# Patient Record
Sex: FEMALE | NOT HISPANIC OR LATINO | URBAN - METROPOLITAN AREA
[De-identification: names, ages, dates, MRNs, and addresses within clinical notes are randomized per-mention and may not be internally consistent; named-entity substitution may affect disease eponyms.]

---

## 2024-09-27 ENCOUNTER — APPOINTMENT (RX ONLY)
Dept: URBAN - METROPOLITAN AREA CLINIC 20 | Facility: CLINIC | Age: 44
Setting detail: DERMATOLOGY
End: 2024-09-27

## 2024-09-27 DIAGNOSIS — L71.8 OTHER ROSACEA: ICD-10-CM | Status: INADEQUATELY CONTROLLED

## 2024-09-27 DIAGNOSIS — D22 MELANOCYTIC NEVI: ICD-10-CM

## 2024-09-27 DIAGNOSIS — L57.8 OTHER SKIN CHANGES DUE TO CHRONIC EXPOSURE TO NONIONIZING RADIATION: ICD-10-CM

## 2024-09-27 DIAGNOSIS — Z71.89 OTHER SPECIFIED COUNSELING: ICD-10-CM

## 2024-09-27 DIAGNOSIS — Z85.828 PERSONAL HISTORY OF OTHER MALIGNANT NEOPLASM OF SKIN: ICD-10-CM

## 2024-09-27 DIAGNOSIS — L81.4 OTHER MELANIN HYPERPIGMENTATION: ICD-10-CM

## 2024-09-27 DIAGNOSIS — D18.0 HEMANGIOMA: ICD-10-CM

## 2024-09-27 DIAGNOSIS — L82.1 OTHER SEBORRHEIC KERATOSIS: ICD-10-CM

## 2024-09-27 PROBLEM — D22.5 MELANOCYTIC NEVI OF TRUNK: Status: ACTIVE | Noted: 2024-09-27

## 2024-09-27 PROBLEM — D18.01 HEMANGIOMA OF SKIN AND SUBCUTANEOUS TISSUE: Status: ACTIVE | Noted: 2024-09-27

## 2024-09-27 PROCEDURE — 99214 OFFICE O/P EST MOD 30 MIN: CPT

## 2024-09-27 PROCEDURE — ? PRESCRIPTION

## 2024-09-27 PROCEDURE — ? INVENTORY

## 2024-09-27 PROCEDURE — ? PRESCRIPTION MEDICATION MANAGEMENT

## 2024-09-27 PROCEDURE — ? COUNSELING

## 2024-09-27 RX ORDER — IVERMECTIN/METRONIDAZOLE/NIACI 1 %-1 %-4%
GEL (GRAM) TOPICAL
Qty: 30 | Refills: 3 | Status: ERX | COMMUNITY
Start: 2024-09-27

## 2024-09-27 RX ADMIN — Medication: at 00:00

## 2024-09-27 ASSESSMENT — LOCATION DETAILED DESCRIPTION DERM
LOCATION DETAILED: SUPERIOR THORACIC SPINE
LOCATION DETAILED: RIGHT MEDIAL MALAR CHEEK
LOCATION DETAILED: INFERIOR MID FOREHEAD
LOCATION DETAILED: INFERIOR THORACIC SPINE
LOCATION DETAILED: LEFT MID-UPPER BACK
LOCATION DETAILED: LEFT MEDIAL MALAR CHEEK
LOCATION DETAILED: LEFT MEDIAL SUPERIOR CHEST
LOCATION DETAILED: LEFT SUPERIOR UPPER BACK
LOCATION DETAILED: LEFT INFERIOR MEDIAL UPPER BACK

## 2024-09-27 ASSESSMENT — LOCATION SIMPLE DESCRIPTION DERM
LOCATION SIMPLE: LEFT UPPER BACK
LOCATION SIMPLE: LEFT CHEEK
LOCATION SIMPLE: UPPER BACK
LOCATION SIMPLE: CHEST
LOCATION SIMPLE: RIGHT CHEEK
LOCATION SIMPLE: INFERIOR FOREHEAD

## 2024-09-27 ASSESSMENT — LOCATION ZONE DERM
LOCATION ZONE: TRUNK
LOCATION ZONE: FACE

## 2024-09-27 ASSESSMENT — SEVERITY ASSESSMENT OVERALL AMONG ALL PATIENTS: IN YOUR EXPERIENCE, AMONG ALL PATIENTS YOU HAVE SEEN WITH THIS CONDITION, HOW SEVERE IS THIS PATIENT'S CONDITION?: MILD

## 2024-09-27 NOTE — PROCEDURE: PRESCRIPTION MEDICATION MANAGEMENT
Render In Strict Bullet Format?: No
Initiate Treatment: Aveidaoxia 1 %-1 %-4 % topical gel. Apply to face once a day
Detail Level: Zone
Plan: Discussed Vbeam laser treatments with patient.\\nRe-check in 6 months.

## 2024-11-13 ENCOUNTER — APPOINTMENT (RX ONLY)
Dept: URBAN - METROPOLITAN AREA CLINIC 20 | Facility: CLINIC | Age: 44
Setting detail: DERMATOLOGY
End: 2024-11-13

## 2024-11-13 DIAGNOSIS — Z41.9 ENCOUNTER FOR PROCEDURE FOR PURPOSES OTHER THAN REMEDYING HEALTH STATE, UNSPECIFIED: ICD-10-CM

## 2024-11-13 PROCEDURE — ? COSMETIC CONSULTATION - PULSED-DYE LASER

## 2024-11-13 PROCEDURE — ? INVENTORY

## 2024-11-14 ENCOUNTER — APPOINTMENT (RX ONLY)
Dept: URBAN - METROPOLITAN AREA CLINIC 20 | Facility: CLINIC | Age: 44
Setting detail: DERMATOLOGY
End: 2024-11-14

## 2024-11-14 DIAGNOSIS — Z41.9 ENCOUNTER FOR PROCEDURE FOR PURPOSES OTHER THAN REMEDYING HEALTH STATE, UNSPECIFIED: ICD-10-CM

## 2024-11-14 PROCEDURE — ? INVENTORY

## 2024-11-14 PROCEDURE — ? ADDITIONAL NOTES

## 2024-11-14 PROCEDURE — ? VBEAM PERFECTA LASER

## 2024-11-14 NOTE — PROCEDURE: VBEAM PERFECTA LASER
Consent: Written consent obtained.  Risks were reviewed including but not limited to crusting, scabbing, blistering, scarring, darker or lighter pigmentary change, bruising, and/or incomplete response.
Delay Time (Ms): 20
Is There A Second Setting?: yes
Delay Time (Ms): 0
Spot Size: 3x10 mm
Treatment Number: 1
Location: face,neck
Spot Size: 3 mm
Is There A Fourth Setting?: no
Pre-Procedure: The treatment areas identified by the patient were cleansed and treatment was performed with the parameters mentioned above.
Fluence (J/Cm2): 11
Pulse Duration: 6 ms
Fluence (J/Cm2): 6
Detail Level: Zone
Post-Procedure: Following the procedure the post care instructions were reviewed with the patient.
Pulse Duration: 1.5 ms
Number Of Pulses: Total 388
Skin Type (Optional): II
Spray Time (Ms): 30
Post-Care Instructions: I reviewed with the patient in detail post-care instructions.  Cool compresses or cold gel packs may be applied after treatment.  Exposure to the sun should be avoided and sun protection and sunscreen should be used.
Pulse Duration: 10 ms
Fluence (J/Cm2): 12
Spot Size: 10 mm

## 2024-11-14 NOTE — PROCEDURE: ADDITIONAL NOTES
Detail Level: Simple
Render Risk Assessment In Note?: no
Additional Notes: 300 honor GN price.
Strong peripheral pulses

## 2024-12-18 ENCOUNTER — APPOINTMENT (OUTPATIENT)
Dept: URBAN - METROPOLITAN AREA CLINIC 20 | Facility: CLINIC | Age: 44
Setting detail: DERMATOLOGY
End: 2024-12-18

## 2024-12-18 DIAGNOSIS — Z41.9 ENCOUNTER FOR PROCEDURE FOR PURPOSES OTHER THAN REMEDYING HEALTH STATE, UNSPECIFIED: ICD-10-CM

## 2024-12-18 PROCEDURE — ? ADDITIONAL NOTES

## 2024-12-18 PROCEDURE — ? VBEAM PERFECTA LASER

## 2024-12-18 PROCEDURE — ? INVENTORY

## 2024-12-18 NOTE — PROCEDURE: VBEAM PERFECTA LASER
Delay Time (Ms): 20
Post-Procedure: Following the procedure the post care instructions were reviewed with the patient.
Skin Type (Optional): II
Pulse Duration: 1.5 ms
Spot Size: 3x10 mm
Spray Time (Ms): 30
Pulse Duration: 10 ms
Is There A Second Setting?: yes
Location: face,neck
Is There A Fifth Setting?: no
Number Of Pulses: Total 363
Delay Time (Ms): 0
Pulse Duration: 6 ms
Treatment Number: 1
Consent: Written consent obtained.  Risks were reviewed including but not limited to crusting, scabbing, blistering, scarring, darker or lighter pigmentary change, bruising, and/or incomplete response.
Fluence (J/Cm2): 6
Fluence (J/Cm2): 11
Detail Level: Zone
Spot Size: 3 mm
Pre-Procedure: The treatment areas identified by the patient were cleansed and treatment was performed with the parameters mentioned above.
Spot Size: 10 mm
Post-Care Instructions: I reviewed with the patient in detail post-care instructions.  Cool compresses or cold gel packs may be applied after treatment.  Exposure to the sun should be avoided and sun protection and sunscreen should be used.

## 2025-01-30 ENCOUNTER — APPOINTMENT (OUTPATIENT)
Dept: URBAN - METROPOLITAN AREA CLINIC 20 | Facility: CLINIC | Age: 45
Setting detail: DERMATOLOGY
End: 2025-01-30

## 2025-01-30 DIAGNOSIS — Z41.9 ENCOUNTER FOR PROCEDURE FOR PURPOSES OTHER THAN REMEDYING HEALTH STATE, UNSPECIFIED: ICD-10-CM

## 2025-01-30 PROCEDURE — ? INVENTORY

## 2025-01-30 PROCEDURE — ? ADDITIONAL NOTES

## 2025-01-30 PROCEDURE — ? VBEAM PERFECTA LASER

## 2025-01-30 NOTE — PROCEDURE: VBEAM PERFECTA LASER
Delay Time (Ms): 0
Pulse Duration: 1.5 ms
Skin Type (Optional): II
Post-Care Instructions: I reviewed with the patient in detail post-care instructions.  Cool compresses or cold gel packs may be applied after treatment.  Exposure to the sun should be avoided and sun protection and sunscreen should be used.
Delay Time (Ms): 20
Spot Size: 10 mm
Is There A Second Setting?: yes
Fluence (J/Cm2): 6
Spot Size: 3 mm
Pre-Procedure: The treatment areas identified by the patient were cleansed and treatment was performed with the parameters mentioned above.
Is There A Fourth Setting?: no
Treatment Number: 3
Fluence (J/Cm2): 11
Consent: Written consent obtained.  Risks were reviewed including but not limited to crusting, scabbing, blistering, scarring, darker or lighter pigmentary change, bruising, and/or incomplete response.
Pulse Duration: 6 ms
Detail Level: Zone
Post-Procedure: Following the procedure the post care instructions were reviewed with the patient.
Pulse Duration: 10 ms
Spot Size: 3x10 mm
Spray Time (Ms): 30
Number Of Pulses: Total 385
Location: face, neck

## 2025-04-22 ENCOUNTER — APPOINTMENT (OUTPATIENT)
Dept: URBAN - METROPOLITAN AREA CLINIC 19 | Facility: CLINIC | Age: 45
Setting detail: DERMATOLOGY
End: 2025-04-22

## 2025-04-22 DIAGNOSIS — B07.8 OTHER VIRAL WARTS: ICD-10-CM | Status: INADEQUATELY CONTROLLED

## 2025-04-22 PROCEDURE — ? LIQUID NITROGEN

## 2025-04-22 PROCEDURE — 17110 DESTRUCTION B9 LES UP TO 14: CPT

## 2025-04-22 PROCEDURE — ? COUNSELING

## 2025-04-22 ASSESSMENT — LOCATION ZONE DERM: LOCATION ZONE: FACE

## 2025-04-22 ASSESSMENT — TOTAL NUMBER OF LESIONS: # OF LESIONS?: 1

## 2025-04-22 ASSESSMENT — LOCATION SIMPLE DESCRIPTION DERM: LOCATION SIMPLE: LEFT FOREHEAD

## 2025-04-22 ASSESSMENT — AREA OF WARTS IN CM2: TOTAL AREA OF ALL WARTS IN CM2: 1

## 2025-04-22 ASSESSMENT — LOCATION DETAILED DESCRIPTION DERM: LOCATION DETAILED: LEFT SUPERIOR LATERAL FOREHEAD

## 2025-04-22 NOTE — PROCEDURE: LIQUID NITROGEN
Spray Paint Technique: No
Medical Necessity Clause: This procedure was medically necessary because the lesions that were treated were:
Medical Necessity Information: It is in your best interest to select a reason for this procedure from the list below. All of these items fulfill various CMS LCD requirements except the new and changing color options.
Number Of Freeze-Thaw Cycles: 1 freeze-thaw cycle
Spray Paint Text: The liquid nitrogen was applied to the skin utilizing a spray paint frosting technique.
Show Applicator Variable?: Yes
Post-Care Instructions: I reviewed with the patient in detail post-care instructions. Patient is to wear sunprotection, and avoid picking at any of the treated lesions. Pt may apply Vaseline to crusted or scabbing areas.
Detail Level: Detailed
Duration Of Freeze Thaw-Cycle (Seconds): 5-10
Consent: The patient's consent was obtained including but not limited to risks of crusting, scabbing, blistering, scarring, darker or lighter pigmentary change, recurrence, incomplete removal and infection.

## 2025-08-25 ENCOUNTER — APPOINTMENT (OUTPATIENT)
Dept: URBAN - METROPOLITAN AREA CLINIC 19 | Facility: CLINIC | Age: 45
Setting detail: DERMATOLOGY
End: 2025-08-25

## 2025-08-25 ENCOUNTER — RX ONLY (RX ONLY)
Age: 45
End: 2025-08-25

## 2025-08-25 DIAGNOSIS — L82.1 OTHER SEBORRHEIC KERATOSIS: ICD-10-CM

## 2025-08-25 DIAGNOSIS — D18.0 HEMANGIOMA: ICD-10-CM

## 2025-08-25 DIAGNOSIS — Z80.8 FAMILY HISTORY OF MALIGNANT NEOPLASM OF OTHER ORGANS OR SYSTEMS: ICD-10-CM

## 2025-08-25 DIAGNOSIS — L81.4 OTHER MELANIN HYPERPIGMENTATION: ICD-10-CM

## 2025-08-25 DIAGNOSIS — D22 MELANOCYTIC NEVI: ICD-10-CM

## 2025-08-25 DIAGNOSIS — L57.8 OTHER SKIN CHANGES DUE TO CHRONIC EXPOSURE TO NONIONIZING RADIATION: ICD-10-CM

## 2025-08-25 DIAGNOSIS — L71.8 OTHER ROSACEA: ICD-10-CM | Status: WELL CONTROLLED

## 2025-08-25 DIAGNOSIS — Z85.828 PERSONAL HISTORY OF OTHER MALIGNANT NEOPLASM OF SKIN: ICD-10-CM

## 2025-08-25 DIAGNOSIS — Z71.89 OTHER SPECIFIED COUNSELING: ICD-10-CM

## 2025-08-25 PROBLEM — D18.01 HEMANGIOMA OF SKIN AND SUBCUTANEOUS TISSUE: Status: ACTIVE | Noted: 2025-08-25

## 2025-08-25 PROBLEM — D22.5 MELANOCYTIC NEVI OF TRUNK: Status: ACTIVE | Noted: 2025-08-25

## 2025-08-25 PROCEDURE — ? PRESCRIPTION

## 2025-08-25 PROCEDURE — ? COUNSELING

## 2025-08-25 PROCEDURE — ? PRESCRIPTION MEDICATION MANAGEMENT

## 2025-08-25 RX ORDER — IVERMECTIN/METRONIDAZOLE/NIACI 1 %-1 %-4%
GEL (GRAM) TOPICAL
Qty: 30 | Refills: 3 | Status: ERX | COMMUNITY
Start: 2025-08-25

## 2025-08-25 RX ORDER — IVERMECTIN/METRONIDAZOLE/NIACI 1 %-1 %-4%
GEL (GRAM) TOPICAL
Qty: 30 | Refills: 3 | Status: CANCELLED
Stop reason: CLARIF

## 2025-08-25 RX ADMIN — Medication: at 00:00

## 2025-08-25 ASSESSMENT — LOCATION SIMPLE DESCRIPTION DERM
LOCATION SIMPLE: RIGHT CHEEK
LOCATION SIMPLE: RIGHT UPPER BACK
LOCATION SIMPLE: LEFT LOWER BACK
LOCATION SIMPLE: RIGHT FOREARM
LOCATION SIMPLE: RIGHT HAND
LOCATION SIMPLE: LEFT FOREARM
LOCATION SIMPLE: LEFT CHEEK
LOCATION SIMPLE: LEFT HAND
LOCATION SIMPLE: RIGHT LOWER BACK
LOCATION SIMPLE: INFERIOR FOREHEAD

## 2025-08-25 ASSESSMENT — LOCATION DETAILED DESCRIPTION DERM
LOCATION DETAILED: LEFT INFERIOR CENTRAL MALAR CHEEK
LOCATION DETAILED: LEFT INFERIOR MEDIAL MIDBACK
LOCATION DETAILED: RIGHT ULNAR DORSAL HAND
LOCATION DETAILED: INFERIOR MID FOREHEAD
LOCATION DETAILED: LEFT ULNAR DORSAL HAND
LOCATION DETAILED: LEFT DISTAL DORSAL FOREARM
LOCATION DETAILED: RIGHT INFERIOR MEDIAL UPPER BACK
LOCATION DETAILED: LEFT MEDIAL MALAR CHEEK
LOCATION DETAILED: LEFT PROXIMAL DORSAL FOREARM
LOCATION DETAILED: RIGHT MEDIAL UPPER BACK
LOCATION DETAILED: RIGHT INFERIOR MEDIAL MALAR CHEEK
LOCATION DETAILED: RIGHT SUPERIOR MEDIAL MIDBACK
LOCATION DETAILED: RIGHT DISTAL DORSAL FOREARM
LOCATION DETAILED: RIGHT LATERAL MALAR CHEEK
LOCATION DETAILED: RIGHT PROXIMAL DORSAL FOREARM
LOCATION DETAILED: RIGHT MEDIAL MALAR CHEEK

## 2025-08-25 ASSESSMENT — LOCATION ZONE DERM
LOCATION ZONE: ARM
LOCATION ZONE: TRUNK
LOCATION ZONE: HAND
LOCATION ZONE: FACE

## 2025-08-25 ASSESSMENT — SEVERITY ASSESSMENT OVERALL AMONG ALL PATIENTS
IN YOUR EXPERIENCE, AMONG ALL PATIENTS YOU HAVE SEEN WITH THIS CONDITION, HOW SEVERE IS THIS PATIENT'S CONDITION?: MINIMAL